# Patient Record
Sex: MALE | Race: ASIAN | NOT HISPANIC OR LATINO | Employment: OTHER | URBAN - METROPOLITAN AREA
[De-identification: names, ages, dates, MRNs, and addresses within clinical notes are randomized per-mention and may not be internally consistent; named-entity substitution may affect disease eponyms.]

---

## 2017-01-10 ENCOUNTER — HOSPITAL ENCOUNTER (OUTPATIENT)
Dept: WOUND CARE | Facility: HOSPITAL | Age: 77
Discharge: HOME | End: 2017-01-10
Attending: INTERNAL MEDICINE
Payer: MEDICARE

## 2017-01-10 DIAGNOSIS — E11.622 TYPE 2 DIABETES MELLITUS WITH ULCER: ICD-10-CM

## 2017-01-10 DIAGNOSIS — L98.499 TYPE 2 DIABETES MELLITUS WITH ULCER: ICD-10-CM

## 2017-01-10 PROCEDURE — 99214 OFFICE O/P EST MOD 30 MIN: CPT | Mod: 25,,, | Performed by: FAMILY MEDICINE

## 2017-01-10 PROCEDURE — 17250 CHEM CAUT OF GRANLTJ TISSUE: CPT | Performed by: FAMILY MEDICINE

## 2017-01-10 PROCEDURE — 25000003 PHARM REV CODE 250

## 2017-01-10 PROCEDURE — 99213 OFFICE O/P EST LOW 20 MIN: CPT | Mod: 25 | Performed by: FAMILY MEDICINE

## 2017-01-10 PROCEDURE — 17250 CHEM CAUT OF GRANLTJ TISSUE: CPT | Mod: ,,, | Performed by: FAMILY MEDICINE

## 2017-01-24 ENCOUNTER — HOSPITAL ENCOUNTER (OUTPATIENT)
Dept: WOUND CARE | Facility: HOSPITAL | Age: 77
Discharge: HOME | End: 2017-01-24
Attending: FAMILY MEDICINE
Payer: MEDICARE

## 2017-01-24 DIAGNOSIS — N18.6 END STAGE RENAL DISEASE: ICD-10-CM

## 2017-01-24 PROCEDURE — 17250 CHEM CAUT OF GRANLTJ TISSUE: CPT | Performed by: FAMILY MEDICINE

## 2017-01-24 PROCEDURE — 99214 OFFICE O/P EST MOD 30 MIN: CPT | Mod: 25,,, | Performed by: FAMILY MEDICINE

## 2017-01-24 PROCEDURE — 17250 CHEM CAUT OF GRANLTJ TISSUE: CPT | Mod: ,,, | Performed by: FAMILY MEDICINE

## 2017-01-24 PROCEDURE — 25000003 PHARM REV CODE 250

## 2017-02-14 ENCOUNTER — HOSPITAL ENCOUNTER (OUTPATIENT)
Dept: WOUND CARE | Facility: HOSPITAL | Age: 77
Discharge: HOME | End: 2017-02-14
Attending: FAMILY MEDICINE
Payer: MEDICARE

## 2017-02-14 DIAGNOSIS — N18.6 END STAGE RENAL DISEASE: ICD-10-CM

## 2017-02-14 DIAGNOSIS — L89.624 PRESSURE ULCER OF LEFT HEEL, STAGE 4: ICD-10-CM

## 2017-02-14 DIAGNOSIS — E11.622 TYPE 2 DIABETES MELLITUS WITH OTHER SKIN ULCER (CODE): ICD-10-CM

## 2017-02-14 PROCEDURE — 99214 OFFICE O/P EST MOD 30 MIN: CPT | Mod: ,,, | Performed by: FAMILY MEDICINE

## 2017-02-14 PROCEDURE — 99213 OFFICE O/P EST LOW 20 MIN: CPT | Performed by: FAMILY MEDICINE

## 2017-03-07 ENCOUNTER — HOSPITAL ENCOUNTER (OUTPATIENT)
Dept: WOUND CARE | Facility: HOSPITAL | Age: 77
Discharge: HOME | End: 2017-03-07
Attending: FAMILY MEDICINE
Payer: MEDICARE

## 2017-03-07 DIAGNOSIS — E11.622 TYPE 2 DIABETES MELLITUS WITH OTHER SKIN ULCER (CODE): ICD-10-CM

## 2017-03-07 DIAGNOSIS — N18.6 END STAGE RENAL DISEASE: ICD-10-CM

## 2017-03-07 DIAGNOSIS — L89.624 PRESSURE ULCER OF LEFT HEEL, STAGE 4: ICD-10-CM

## 2017-03-07 PROCEDURE — 11055 PARING/CUTG B9 HYPRKER LES 1: CPT | Performed by: FAMILY MEDICINE

## 2017-03-07 PROCEDURE — 99214 OFFICE O/P EST MOD 30 MIN: CPT | Mod: 25,S$PBB,, | Performed by: FAMILY MEDICINE

## 2017-03-07 PROCEDURE — 11055 PARING/CUTG B9 HYPRKER LES 1: CPT | Mod: S$PBB,,, | Performed by: FAMILY MEDICINE

## 2017-03-28 ENCOUNTER — HOSPITAL ENCOUNTER (EMERGENCY)
Facility: OTHER | Age: 77
Discharge: HOME | End: 2017-03-28
Attending: EMERGENCY MEDICINE
Payer: MEDICARE

## 2017-03-28 VITALS
OXYGEN SATURATION: 98 % | TEMPERATURE: 97 F | HEART RATE: 84 BPM | SYSTOLIC BLOOD PRESSURE: 114 MMHG | HEIGHT: 65 IN | DIASTOLIC BLOOD PRESSURE: 51 MMHG | RESPIRATION RATE: 18 BRPM | BODY MASS INDEX: 23.32 KG/M2 | WEIGHT: 140 LBS
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
## 2017-03-28 PROCEDURE — 99284 EMERGENCY DEPT VISIT MOD MDM: CPT

## 2017-03-28 RX ORDER — OXYCODONE AND ACETAMINOPHEN 5; 325 MG/1; MG/1
1 TABLET ORAL EVERY 4 HOURS PRN
COMMUNITY
End: 2017-03-28

## 2017-03-28 RX ORDER — METOPROLOL SUCCINATE 25 MG/1
12.5 TABLET, EXTENDED RELEASE ORAL 2 TIMES DAILY
COMMUNITY

## 2017-03-28 RX ORDER — TRAMADOL HYDROCHLORIDE 50 MG/1
50 TABLET ORAL EVERY 12 HOURS PRN
COMMUNITY

## 2017-03-28 RX ORDER — TRAMADOL HYDROCHLORIDE 50 MG/1
50 TABLET ORAL
Status: DISCONTINUED | OUTPATIENT
Start: 2017-03-28 | End: 2017-03-28 | Stop reason: HOSPADM

## 2017-03-28 RX ORDER — CALCIUM CARBONATE 600 MG
600 TABLET ORAL 2 TIMES DAILY WITH MEALS
COMMUNITY

## 2017-03-28 RX ORDER — ASPIRIN 81 MG/1
81 TABLET ORAL DAILY
COMMUNITY

## 2017-03-28 RX ORDER — OXYCODONE AND ACETAMINOPHEN 5; 325 MG/1; MG/1
1 TABLET ORAL EVERY 6 HOURS PRN
Qty: 12 TABLET | Refills: 0 | Status: SHIPPED | OUTPATIENT
Start: 2017-03-28

## 2017-03-28 NOTE — ED TRIAGE NOTES
Pt fell out of wheelchair on 3/17. Pt has pain to both knees and R elbow. Some swelling noted to R elbow. Pt has some healing abrasions to bilateral knees.

## 2017-03-28 NOTE — ED PROVIDER NOTES
Encounter Date: 3/28/2017       History     Chief Complaint   Patient presents with    Elbow Pain     + right elbow injury s/p a fall around 3/17. Pt states he fell on his right elbow and now it hurts to move or extent     Review of patient's allergies indicates:  No Known Allergies  HPI Comments: Mr Guerra has hx of ESRD on HD, is blind,  family reports fall onto R elbow on 3/17, has had pain since. Unable to fully straighten R elbow. No other associated pain. No tingling or numbness, no other injuries.     The history is provided by the patient, a relative and a caregiver.     History reviewed. No pertinent past medical history.  History reviewed. No pertinent surgical history.  History reviewed. No pertinent family history.  Social History   Substance Use Topics    Smoking status: Never Smoker    Smokeless tobacco: None    Alcohol use None     Review of Systems   Musculoskeletal:        Positive pain in R elbow w decreased rom.    All other systems reviewed and are negative.      Physical Exam   Initial Vitals   BP Pulse Resp Temp SpO2   03/28/17 1338 03/28/17 1338 03/28/17 1338 03/28/17 1338 03/28/17 1338   114/51 84 18 97.3 °F (36.3 °C) 98 %     Physical Exam    Nursing note and vitals reviewed.  Constitutional: He appears well-developed and well-nourished. He is not diaphoretic.   Uncomfortable when moving RUe, otherwise NAD.    HENT:   Head: Normocephalic and atraumatic.   Pulmonary/Chest: Breath sounds normal. No respiratory distress.   Musculoskeletal: He exhibits tenderness.   Mild diffuse swelling in RUE from mid forearm to proximal elbow compared w LUE. Normal perfusion, normal 2+radial pulse, good perfusion of extremity. Pain with active and passive rom in R elbow. Unable to fully straighten.    Neurological: He is alert and oriented to person, place, and time.   Skin: Skin is warm. No rash noted. No erythema.   Psychiatric: He has a normal mood and affect. Thought content normal.         ED Course    Procedures  Labs Reviewed - No data to display          Medical Decision Making:   ED Management:  Family adamantly wants to leave. Will return prn. Advised need to f/u w ortho. Discussed ct results. Given sling, didn't want to discuss other splinting options.         Imaging Results         CT Upper Extremity Wo Contrast Right (Final result) Result time:  03/28/17 15:28:06    Final result by Interface, Rad Results In (03/28/17 15:28:06)    Narrative:       CT UPPER EXTREMITY WO CONTRAST RIGHT  Clinical history: Right elbow pain after falling one week ago.     Comparison: Right elbow series dated 3/28/2017.     Technique:  1.25 mm axial CT images of the right upper extremity were obtained without intravenous   contrast.  Thin axial reconstructions were performed and provided as separate series.    Coronal and sagittal reformats were performed and provided as separate series.     The total DLP = 347.1 mGy-cm.     Findings:  The bony structures are diffusely osteopenic.  A subtle nondisplaced fracture is noted   through the lateral humeral condyle.  This can be seen on image 182 of the thin bone   axial reconstructions.  This can also be seen on image 17 of the sagittal   reconstructions.  It should be noted that the series labeled coronal bone reconstruction   is actually the sagittal bone reconstruction.  No definite extension into the elbow joint   is seen.  No large right elbow joint effusion is noted.  The radial head is intact.  The   olecranon and coronoid process are intact.     Impression:  1.  Subtle nondisplaced right lateral humeral condyle fracture.  2.  Osteopenic-appearing bones.     SL: 24     Signed by: Jacob Peterson M.D.  2017-03-28 15:28:03            X-Ray Elbow Complete Right (Final result) Result time:  03/28/17 14:14:17    Final result by Interface, Rad Results In (03/28/17 14:14:17)    Narrative:    Study Desc:   XR ELBOW COMPLETE 3 VIEW RIGHT  Clinical History: Right elbow pain after fall  one week ago.     Comparison: None     Findings:     3 views of the right elbow.  Bones are osteopenic.  No definite fracture identified.  No   elbow joint effusion.  No focal soft tissue abnormality.     Impression:     Osteopenia.  No radiographically apparent fracture.     If clinical symptoms persist, follow-up MRI should be considered.     SL: 24  Signed by: Caesar Pacheco MD  2017-03-28 14:14:15                          ED Course     Clinical Impression:   The primary encounter diagnosis was Fracture of humeral condyle, right, closed, initial encounter. Diagnoses of Fall with injury and Fall, initial encounter were also pertinent to this visit.          Kal Casas MD  03/28/17 1534

## 2017-03-28 NOTE — ED AVS SNAPSHOT
[facility] EMERGENCY DEPARTMENT  [address]  [place] [address]               Jamal Guerra   3/28/2017  1:58 PM   ED    Description:  Male : 1940   Department:  [facility] Emergency Department           Your Care was Coordinated By:     Provider Role From To    Kal Casas MD Attending Provider [phone] --      Reason for Visit     Elbow Pain           Diagnoses this Visit        Comments    Fracture of humeral condyle, right, closed, initial encounter    -  Primary     Fall with injury         Fall, initial encounter           ED Disposition     ED Disposition Condition Comment    Discharge  Jamal Guerra discharge to home/self care.    - Condition at discharge: Stable  - Mode of Discharge: by walking out   - The patient left the ED accompanied by a family member.           To Do List           Follow-up Information     Schedule an appointment as soon as possible for a visit with Berry Jones MD.    Specialty:  Orthopedic Surgery    Contact information:    [address]  [facility]  [place] [address]  [phone]         These Medications        Disp Refills Start End    oxycodone-acetaminophen (PERCOCET) 5-325 mg per tablet 12 tablet 0 3/28/2017     Take 1 tablet by mouth every 6 (six) hours as needed for Pain. - Oral    Ph[facility]acy: [facility] [id number] [place] [address] AT [facility] #: [phone]         [facility] On Call     [facility] On Call Nurse Care Line -  Assistance  Registered nurses in the [facility] On Call Center provide clinical advisement, health education, appointment booking, and other advisory services.  Call for this free service at [phone].             Medications           Message regarding Medications     Verify the changes and/or additions to your medication regime listed below are the same as discussed with your clinician today.  If any of these changes or additions are incorrect, please  "notify your healthcare provider.        START taking these NEW medications        Refills    oxycodone-acetaminophen (PERCOCET) 5-325 mg per tablet 0    Sig: Take 1 tablet by mouth every 6 (six) hours as needed for Pain.    Class: Print    Route: Oral      These medications were administered today        Dose Freq    tramadol tablet 50 mg 50 mg ED 1 Time    Sig: Take 1 tablet (50 mg total) by mouth ED 1 Time.    Class: Normal    Route: Oral           Verify that the below list of medications is an accurate representation of the medications you are currently taking.  If none reported, the list may be blank. If incorrect, please contact your healthcare provider. Carry this list with you in case of emergency.           Current Medications     aspirin (ECOTRIN) 81 MG EC tablet Take 81 mg by mouth once daily.    B COMPLEX W-C NO.20/FOLIC ACID (RENAL CAPS ORAL) Take by mouth.    calcium carbonate (OS-SUHAS) 600 mg (1,500 mg) Tab Take 600 mg by mouth 2 (two) times daily with meals.    dronedarone (MULTAQ) 400 mg Tab Take 400 mg by mouth 2 (two) times daily with meals.    metoprolol succinate (TOPROL-XL) 25 MG 24 hr tablet Take 12.5 mg by mouth 2 (two) times daily.    tramadol (ULTRAM) 50 mg tablet Take 50 mg by mouth every 12 (twelve) hours as needed for Pain.    collagenase ointment Apply topically once daily.    miscellaneous medical supply Pack Please supply pt with 2 heel protectors to be worn at all times.    oxycodone-acetaminophen (PERCOCET) 5-325 mg per tablet Take 1 tablet by mouth every 6 (six) hours as needed for Pain.    tramadol tablet 50 mg Take 1 tablet (50 mg total) by mouth ED 1 Time.           Clinical Reference Information           Your Vitals Were     BP Pulse Temp Resp Height Weight    114/51 84 97.3 °F (36.3 °C) (Temporal) 18 5' 5" (1.651 m) 63.5 kg (140 lb)    SpO2 BMI             98% 23.3 kg/m2         Allergies as of 3/28/2017     No Known Allergies      Immunizations Administered on Date of " Encounter - 3/28/2017     None      ED Micro, Lab, POCT     None      ED Imaging Orders     Start Ordered       Status Ordering Provider    03/28/17 1438 03/28/17 1437  CT Upper Extremity Wo Contrast Right  1 time imaging      Final result     03/28/17 1352 03/28/17 1351  X-Ray Elbow Complete Right  1 time imaging      Final result       Discharge References/Attachments     FRACTURE, UPPER EXTREMITY (Urdu)      [facility] Sign-Up     Activating your [facility] account is as easy as 1-2-3!     1) Visit [email or web address], select Sign Up Now, enter this activation code and your date of birth, then select Next.  [email or web address]  Expires: 5/12/2017  3:33 PM      2) Create a username and password to use when you visit [facility] in the future and select a security question in case you lose your password and select Next.    3) Enter your e-mail address and click Sign Up!    Additional Information  If you have questions, please e-mail [email or web address] or call [phone] to talk to our [facility] staff. Remember, [facility] is NOT to be used for urgent needs. For medical emergencies, dial 911.          [facility] Emergency Department complies with applicable Federal civil rights laws and does not discriminate on the basis of race, color, national origin, age, disability, or sex.        Language Assistance Services     ATTENTION: Language assistance services are available, free of charge. Please call [phone].      ATENCIÓN: Si habla español, tiene a pappas disposición servicios gratuitos de asistencia lingüística. Llame al [phone].     CHÚ Ý: N?u b?n nói Ti?ng Vi?t, có các d?ch v? h? tr? ngôn ng? mi?n phí dành cho b?n. G?i s? [phone].

## 2017-07-05 DIAGNOSIS — L89.622 STAGE II PRESSURE ULCER OF LEFT HEEL: Primary | ICD-10-CM

## 2017-07-11 ENCOUNTER — HOSPITAL ENCOUNTER (OUTPATIENT)
Dept: WOUND CARE | Facility: HOSPITAL | Age: 77
Discharge: HOME | End: 2017-07-11
Attending: FAMILY MEDICINE
Payer: MEDICARE

## 2017-07-11 DIAGNOSIS — L89.623 PRESSURE ULCER OF LEFT HEEL, STAGE 3: ICD-10-CM

## 2017-07-11 DIAGNOSIS — L89.523 PRESSURE ULCER OF LEFT ANKLE, STAGE 3: Primary | ICD-10-CM

## 2017-07-11 PROCEDURE — 87070 CULTURE OTHR SPECIMN AEROBIC: CPT

## 2017-07-11 PROCEDURE — 99215 OFFICE O/P EST HI 40 MIN: CPT | Performed by: FAMILY MEDICINE

## 2017-07-11 PROCEDURE — 99214 OFFICE O/P EST MOD 30 MIN: CPT | Mod: ,,, | Performed by: FAMILY MEDICINE

## 2017-07-11 PROCEDURE — 25000003 PHARM REV CODE 250

## 2017-07-15 LAB — BACTERIA SPEC AEROBE CULT: NO GROWTH

## 2017-07-18 ENCOUNTER — HOSPITAL ENCOUNTER (OUTPATIENT)
Dept: WOUND CARE | Facility: HOSPITAL | Age: 77
Discharge: HOME | End: 2017-07-18
Attending: FAMILY MEDICINE
Payer: MEDICARE

## 2017-07-18 DIAGNOSIS — E11.622 DIABETES MELLITUS WITH SKIN ULCER: ICD-10-CM

## 2017-07-18 DIAGNOSIS — L98.499 DIABETES MELLITUS WITH SKIN ULCER: ICD-10-CM

## 2017-07-18 DIAGNOSIS — L89.523 PRESSURE ULCER OF LEFT ANKLE, STAGE 3: Primary | ICD-10-CM

## 2017-07-18 DIAGNOSIS — L89.623 PRESSURE ULCER OF LEFT HEEL, STAGE 3: ICD-10-CM

## 2017-07-18 PROCEDURE — 99214 OFFICE O/P EST MOD 30 MIN: CPT | Mod: 25,,, | Performed by: FAMILY MEDICINE

## 2017-07-18 PROCEDURE — 11042 DBRDMT SUBQ TIS 1ST 20SQCM/<: CPT | Mod: ,,, | Performed by: FAMILY MEDICINE

## 2017-07-18 PROCEDURE — 11042 DBRDMT SUBQ TIS 1ST 20SQCM/<: CPT | Performed by: FAMILY MEDICINE

## 2017-07-18 PROCEDURE — 25000003 PHARM REV CODE 250

## 2017-07-25 ENCOUNTER — HOSPITAL ENCOUNTER (OUTPATIENT)
Dept: WOUND CARE | Facility: HOSPITAL | Age: 77
Discharge: HOME | End: 2017-07-25
Attending: FAMILY MEDICINE
Payer: MEDICARE

## 2017-07-25 DIAGNOSIS — E11.622 DIABETES MELLITUS WITH SKIN ULCER: ICD-10-CM

## 2017-07-25 DIAGNOSIS — L89.523 PRESSURE ULCER OF LEFT ANKLE, STAGE 3: Primary | ICD-10-CM

## 2017-07-25 DIAGNOSIS — L98.499 DIABETES MELLITUS WITH SKIN ULCER: ICD-10-CM

## 2017-07-25 DIAGNOSIS — L89.623 PRESSURE ULCER OF LEFT HEEL, STAGE 3: ICD-10-CM

## 2017-07-25 PROCEDURE — 25000003 PHARM REV CODE 250

## 2017-07-25 PROCEDURE — 99214 OFFICE O/P EST MOD 30 MIN: CPT | Mod: 25,,, | Performed by: FAMILY MEDICINE

## 2017-07-25 PROCEDURE — 11042 DBRDMT SUBQ TIS 1ST 20SQCM/<: CPT | Mod: ,,, | Performed by: FAMILY MEDICINE

## 2017-07-25 PROCEDURE — 11042 DBRDMT SUBQ TIS 1ST 20SQCM/<: CPT | Performed by: FAMILY MEDICINE

## 2017-07-25 RX ORDER — CEFDINIR 300 MG/1
300 CAPSULE ORAL 2 TIMES DAILY
Qty: 28 CAPSULE | Refills: 0 | Status: SHIPPED | OUTPATIENT
Start: 2017-07-25 | End: 2017-08-08

## 2017-08-01 ENCOUNTER — HOSPITAL ENCOUNTER (OUTPATIENT)
Dept: RADIOLOGY | Facility: HOSPITAL | Age: 77
Discharge: HOME | End: 2017-08-01
Attending: FAMILY MEDICINE
Payer: MEDICARE

## 2017-08-01 ENCOUNTER — HOSPITAL ENCOUNTER (OUTPATIENT)
Dept: WOUND CARE | Facility: HOSPITAL | Age: 77
Discharge: HOME | End: 2017-08-01
Attending: FAMILY MEDICINE
Payer: MEDICARE

## 2017-08-01 DIAGNOSIS — L98.499 DIABETES MELLITUS WITH SKIN ULCER: ICD-10-CM

## 2017-08-01 DIAGNOSIS — L89.623 PRESSURE ULCER OF LEFT HEEL, STAGE 3: ICD-10-CM

## 2017-08-01 DIAGNOSIS — E11.622 DIABETES MELLITUS WITH SKIN ULCER: ICD-10-CM

## 2017-08-01 DIAGNOSIS — L89.523 PRESSURE ULCER OF LEFT ANKLE, STAGE 3: ICD-10-CM

## 2017-08-01 DIAGNOSIS — L89.523 PRESSURE ULCER OF LEFT ANKLE, STAGE 3: Primary | ICD-10-CM

## 2017-08-01 PROCEDURE — 99214 OFFICE O/P EST MOD 30 MIN: CPT | Mod: 25,,, | Performed by: FAMILY MEDICINE

## 2017-08-01 PROCEDURE — 93922 UPR/L XTREMITY ART 2 LEVELS: CPT | Mod: TC

## 2017-08-01 PROCEDURE — 93922 UPR/L XTREMITY ART 2 LEVELS: CPT | Mod: 26,,, | Performed by: RADIOLOGY

## 2017-08-01 PROCEDURE — 11042 DBRDMT SUBQ TIS 1ST 20SQCM/<: CPT | Performed by: FAMILY MEDICINE

## 2017-08-01 PROCEDURE — 25000003 PHARM REV CODE 250

## 2017-08-01 PROCEDURE — 11042 DBRDMT SUBQ TIS 1ST 20SQCM/<: CPT | Mod: ,,, | Performed by: FAMILY MEDICINE

## 2017-08-15 ENCOUNTER — HOSPITAL ENCOUNTER (OUTPATIENT)
Dept: WOUND CARE | Facility: HOSPITAL | Age: 77
Discharge: HOME | End: 2017-08-15
Attending: FAMILY MEDICINE
Payer: MEDICARE

## 2017-08-15 DIAGNOSIS — L98.499 DIABETES MELLITUS WITH SKIN ULCER: ICD-10-CM

## 2017-08-15 DIAGNOSIS — L89.523 PRESSURE ULCER OF LEFT ANKLE, STAGE 3: Primary | ICD-10-CM

## 2017-08-15 DIAGNOSIS — E11.622 DIABETES MELLITUS WITH SKIN ULCER: ICD-10-CM

## 2017-08-15 PROCEDURE — 11042 DBRDMT SUBQ TIS 1ST 20SQCM/<: CPT | Performed by: FAMILY MEDICINE

## 2017-08-15 PROCEDURE — 99214 OFFICE O/P EST MOD 30 MIN: CPT | Mod: 25,,, | Performed by: FAMILY MEDICINE

## 2017-08-15 PROCEDURE — 11042 DBRDMT SUBQ TIS 1ST 20SQCM/<: CPT | Mod: ,,, | Performed by: FAMILY MEDICINE

## 2017-09-05 ENCOUNTER — HOSPITAL ENCOUNTER (OUTPATIENT)
Dept: WOUND CARE | Facility: HOSPITAL | Age: 77
Discharge: HOME | End: 2017-09-05
Attending: FAMILY MEDICINE
Payer: MEDICARE

## 2017-09-05 DIAGNOSIS — E11.622 DIABETES MELLITUS WITH SKIN ULCER: Primary | ICD-10-CM

## 2017-09-05 DIAGNOSIS — L98.499 DIABETES MELLITUS WITH SKIN ULCER: Primary | ICD-10-CM

## 2017-09-05 DIAGNOSIS — L97.522 ULCER OF LEFT FOOT, WITH FAT LAYER EXPOSED: ICD-10-CM

## 2017-09-05 PROCEDURE — 25000003 PHARM REV CODE 250

## 2017-09-05 PROCEDURE — 11042 DBRDMT SUBQ TIS 1ST 20SQCM/<: CPT | Mod: ,,, | Performed by: FAMILY MEDICINE

## 2017-09-05 PROCEDURE — 11042 DBRDMT SUBQ TIS 1ST 20SQCM/<: CPT | Performed by: FAMILY MEDICINE

## 2017-09-05 PROCEDURE — 99214 OFFICE O/P EST MOD 30 MIN: CPT | Mod: 25,,, | Performed by: FAMILY MEDICINE
